# Patient Record
Sex: FEMALE | Race: OTHER | HISPANIC OR LATINO | URBAN - METROPOLITAN AREA
[De-identification: names, ages, dates, MRNs, and addresses within clinical notes are randomized per-mention and may not be internally consistent; named-entity substitution may affect disease eponyms.]

---

## 2021-02-06 ENCOUNTER — EMERGENCY (EMERGENCY)
Facility: HOSPITAL | Age: 26
LOS: 1 days | Discharge: HOME | End: 2021-02-06
Attending: EMERGENCY MEDICINE
Payer: MEDICAID

## 2021-02-06 VITALS
HEART RATE: 115 BPM | HEIGHT: 61.81 IN | OXYGEN SATURATION: 100 % | SYSTOLIC BLOOD PRESSURE: 116 MMHG | DIASTOLIC BLOOD PRESSURE: 78 MMHG | WEIGHT: 110.01 LBS | TEMPERATURE: 98 F | RESPIRATION RATE: 20 BRPM

## 2021-02-06 VITALS — SYSTOLIC BLOOD PRESSURE: 110 MMHG | DIASTOLIC BLOOD PRESSURE: 74 MMHG | HEART RATE: 89 BPM

## 2021-02-06 DIAGNOSIS — O02.1 MISSED ABORTION: ICD-10-CM

## 2021-02-06 LAB
ANION GAP SERPL CALC-SCNC: 6 MMOL/L — SIGNIFICANT CHANGE UP (ref 5–17)
APPEARANCE UR: ABNORMAL
BACTERIA # UR AUTO: ABNORMAL /HPF
BASOPHILS # BLD AUTO: 0.06 K/UL — SIGNIFICANT CHANGE UP (ref 0–0.2)
BASOPHILS NFR BLD AUTO: 0.7 % — SIGNIFICANT CHANGE UP (ref 0–2)
BILIRUB UR-MCNC: ABNORMAL
BLD GP AB SCN SERPL QL: SIGNIFICANT CHANGE UP
BUN SERPL-MCNC: 8 MG/DL — SIGNIFICANT CHANGE UP (ref 7–18)
CALCIUM SERPL-MCNC: 8.5 MG/DL — SIGNIFICANT CHANGE UP (ref 8.4–10.5)
CHLORIDE SERPL-SCNC: 106 MMOL/L — SIGNIFICANT CHANGE UP (ref 96–108)
CO2 SERPL-SCNC: 25 MMOL/L — SIGNIFICANT CHANGE UP (ref 22–31)
COLOR SPEC: ABNORMAL
COMMENT - URINE: SIGNIFICANT CHANGE UP
CREAT SERPL-MCNC: 0.74 MG/DL — SIGNIFICANT CHANGE UP (ref 0.5–1.3)
DIFF PNL FLD: ABNORMAL
EOSINOPHIL # BLD AUTO: 0.06 K/UL — SIGNIFICANT CHANGE UP (ref 0–0.5)
EOSINOPHIL NFR BLD AUTO: 0.7 % — SIGNIFICANT CHANGE UP (ref 0–6)
EPI CELLS # UR: ABNORMAL /HPF
GLUCOSE SERPL-MCNC: 95 MG/DL — SIGNIFICANT CHANGE UP (ref 70–99)
GLUCOSE UR QL: NEGATIVE — SIGNIFICANT CHANGE UP
HCG SERPL-ACNC: HIGH MIU/ML
HCT VFR BLD CALC: 37.4 % — SIGNIFICANT CHANGE UP (ref 34.5–45)
HGB BLD-MCNC: 12.1 G/DL — SIGNIFICANT CHANGE UP (ref 11.5–15.5)
IMM GRANULOCYTES NFR BLD AUTO: 0.2 % — SIGNIFICANT CHANGE UP (ref 0–1.5)
KETONES UR-MCNC: ABNORMAL
LEUKOCYTE ESTERASE UR-ACNC: ABNORMAL
LYMPHOCYTES # BLD AUTO: 2.11 K/UL — SIGNIFICANT CHANGE UP (ref 1–3.3)
LYMPHOCYTES # BLD AUTO: 23.8 % — SIGNIFICANT CHANGE UP (ref 13–44)
MCHC RBC-ENTMCNC: 26.4 PG — LOW (ref 27–34)
MCHC RBC-ENTMCNC: 32.4 GM/DL — SIGNIFICANT CHANGE UP (ref 32–36)
MCV RBC AUTO: 81.7 FL — SIGNIFICANT CHANGE UP (ref 80–100)
MONOCYTES # BLD AUTO: 0.63 K/UL — SIGNIFICANT CHANGE UP (ref 0–0.9)
MONOCYTES NFR BLD AUTO: 7.1 % — SIGNIFICANT CHANGE UP (ref 2–14)
NEUTROPHILS # BLD AUTO: 5.99 K/UL — SIGNIFICANT CHANGE UP (ref 1.8–7.4)
NEUTROPHILS NFR BLD AUTO: 67.5 % — SIGNIFICANT CHANGE UP (ref 43–77)
NITRITE UR-MCNC: NEGATIVE — SIGNIFICANT CHANGE UP
NRBC # BLD: 0 /100 WBCS — SIGNIFICANT CHANGE UP (ref 0–0)
PH UR: 6.5 — SIGNIFICANT CHANGE UP (ref 5–8)
PLATELET # BLD AUTO: 248 K/UL — SIGNIFICANT CHANGE UP (ref 150–400)
POTASSIUM SERPL-MCNC: 3.3 MMOL/L — LOW (ref 3.5–5.3)
POTASSIUM SERPL-SCNC: 3.3 MMOL/L — LOW (ref 3.5–5.3)
PROT UR-MCNC: 500 MG/DL
RBC # BLD: 4.58 M/UL — SIGNIFICANT CHANGE UP (ref 3.8–5.2)
RBC # FLD: 14.2 % — SIGNIFICANT CHANGE UP (ref 10.3–14.5)
RBC CASTS # UR COMP ASSIST: >50 /HPF (ref 0–2)
SODIUM SERPL-SCNC: 137 MMOL/L — SIGNIFICANT CHANGE UP (ref 135–145)
SP GR SPEC: 1.02 — SIGNIFICANT CHANGE UP (ref 1.01–1.02)
UROBILINOGEN FLD QL: NEGATIVE — SIGNIFICANT CHANGE UP
WBC # BLD: 8.87 K/UL — SIGNIFICANT CHANGE UP (ref 3.8–10.5)
WBC # FLD AUTO: 8.87 K/UL — SIGNIFICANT CHANGE UP (ref 3.8–10.5)
WBC UR QL: SIGNIFICANT CHANGE UP /HPF (ref 0–5)

## 2021-02-06 PROCEDURE — 76817 TRANSVAGINAL US OBSTETRIC: CPT

## 2021-02-06 PROCEDURE — 85025 COMPLETE CBC W/AUTO DIFF WBC: CPT

## 2021-02-06 PROCEDURE — 99284 EMERGENCY DEPT VISIT MOD MDM: CPT | Mod: 25

## 2021-02-06 PROCEDURE — 84702 CHORIONIC GONADOTROPIN TEST: CPT

## 2021-02-06 PROCEDURE — 76801 OB US < 14 WKS SINGLE FETUS: CPT

## 2021-02-06 PROCEDURE — 86901 BLOOD TYPING SEROLOGIC RH(D): CPT

## 2021-02-06 PROCEDURE — 76817 TRANSVAGINAL US OBSTETRIC: CPT | Mod: 26

## 2021-02-06 PROCEDURE — 86850 RBC ANTIBODY SCREEN: CPT

## 2021-02-06 PROCEDURE — 36415 COLL VENOUS BLD VENIPUNCTURE: CPT

## 2021-02-06 PROCEDURE — 76801 OB US < 14 WKS SINGLE FETUS: CPT | Mod: 26

## 2021-02-06 PROCEDURE — 80048 BASIC METABOLIC PNL TOTAL CA: CPT

## 2021-02-06 PROCEDURE — 81001 URINALYSIS AUTO W/SCOPE: CPT

## 2021-02-06 PROCEDURE — 99285 EMERGENCY DEPT VISIT HI MDM: CPT

## 2021-02-06 PROCEDURE — 86900 BLOOD TYPING SEROLOGIC ABO: CPT

## 2021-02-06 RX ORDER — SODIUM CHLORIDE 9 MG/ML
1000 INJECTION INTRAMUSCULAR; INTRAVENOUS; SUBCUTANEOUS ONCE
Refills: 0 | Status: DISCONTINUED | OUTPATIENT
Start: 2021-02-06 | End: 2021-02-06

## 2021-02-06 NOTE — ED PROVIDER NOTE - PATIENT PORTAL LINK FT
You can access the FollowMyHealth Patient Portal offered by [facility] by registering at the following website: [email or web address]. By joining [facility]’s FollowMyHealth portal, you will also be able to view your health information using other applications (apps) compatible with our system.

## 2021-02-06 NOTE — ED PROVIDER NOTE - PROGRESS NOTE DETAILS
Pt seen and evaluated by GYN attending, recommend discharge and pt to return to ED in 2 days for repeat blood and sono. Precautions reviewed with  and pt voices understanding.

## 2021-02-06 NOTE — ED PROVIDER NOTE - OBJECTIVE STATEMENT
Translation services for Citizen of [place] were utilized ([id number]) 26 y/o F  presents to the ED c/o vaginal bleeding. Patient states she had x1 episode of vaginal bleeding last week and today is heavily bleeding as well. Denies any fevers, chills, nausea, vomiting, urinary complaints, abdominal pain or any other complaints. LMP: 2021

## 2021-02-06 NOTE — CONSULT NOTE ADULT - SUBJECTIVE AND OBJECTIVE BOX
HPI: 24 y/o  LMP: 2020 (irregular LMP) presents to the ED with heavy vaginal bleeding since last night. Pt states she had her period 2020 as normal and took a home pregnancy test with two positive results. Pt states she had 1 episode of spotting on 31 which resolved and then started the heavy bleeding last night. Pt states the bleeding is associated with mild abd pain. Pt states this is a desired pregnancy. Pt denies vaginal discharge, cp, sob, dizziness, palpitations, n/v/d/c, fever, chills or any other complaints     Pt does not have a OBGYN     pob/gynhx:    2012   x1 in [place] uncomplicated per patient  2019 primary c/s - pt unsure of indication   Pt reports h/o ovarian cysts, denies std's, abn pap smear, fibroids   pmhx: denies   pshx: c/s x1, appendectomy   all: NKA  meds: Tylenol prn   sochx: Pt denies etoh/drug/tobacco use  pt denies h/o anxiety or depression    REVIEW OF SYSTEMS: see HPI	    PE:  Vital Signs Last 24 Hrs  T(C): 36.4 (2021 10:25), Max: 36.4 (2021 10:25)  T(F): 97.5 (2021 10:25), Max: 97.5 (2021 10:25)  HR: 89 (2021 13:46) (89 - 115)  BP: 110/74 (2021 13:46) (110/74 - 116/78)  BP(mean): --  RR: 20 (2021 10:25) (20 - 20)  SpO2: 100% (2021 10:25) (100% - 100%)    PE  Gen: A&Ox3, NAD  abd: +bs; soft, nt, nd, no rebound tenderness or guarding; no cvat b/l  pelvis: no cmt; (+) moderate vaginal bleeding, ~ 10 to 15 cc dark blood in vaginal canal, no abnormal discharge; no odor, closed/long, no uterine tenderness; uterus approx 8wks size regular in contour, mobile. No adnexal tenderness or masses appreciated b/l     LABS:                        12.1   8.87  )-----------( 248      ( 2021 11:35 )             37.4     02-    137  |  106  |  8   ----------------------------<  95  3.3<L>   |  25  |  0.74    Ca    8.5      2021 11:35    [facility] [place] [id number]      Urinalysis Basic - ( 2021 11:35 )    Color: Brown / Appearance: very cloudy / S.025 / pH: x  Gluc: x / Ketone: Large  / Bili: Small / Urobili: Negative   Blood: x / Protein: 500 mg/dL / Nitrite: Negative   Leuk Esterase: Trace / RBC: >50 /HPF / WBC 0-2 /HPF   Sq Epi: x / Non Sq Epi: Occasional /HPF / Bacteria: Moderate /HPF        RADIOLOGY & ADDITIONAL STUDIES:  l< from: US Transvaginal, OB (21 @ 12:49) >    EXAM:  US OB TRANSVAGINAL                          EXAM:  US OB LES THAN 14 WKS 1ST GEST                            PROCEDURE DATE:  2021        INTERPRETATION:  CLINICAL INFORMATION: Vaginal bleeding, pelvic pain, beta hCG of 19,000    LMP: 2021    Estimated Gestational Age by LMP: 2 weeks 0 days    COMPARISON: None available.    Endovaginal and transabdominal pelvic sonogram.    FINDINGS:  Uterus: Retroverted measuring 9.1 x 5.4 x 6.6 cm.    Endometrium: Markedly thickened endometrium measuring up to 2.1 cm with internal vascularity. No intrauterine gestational sac identified.    Right ovary: 3.9 x 2.7 x 3.9 cm. Right adnexal complex lesion measuring 2.3 x 1.8 x 2.3 cm.  Left ovary: 3.0 x 1.7 x 2.1 cm. Within normal limits.    Ovarian Waveforms: normal arterial and venous    Fluid: None.    IMPRESSION:    No intrauterine pregnancy.Right adnexal complex lesion measuring 2.3 cm, with beta hCG of 19,000, is concerning for an ectopic pregnancy until proven otherwise.    Critical value:  I discussed the finding of this report with Dr. Osborne at 2021 1:24 PM.  Critical value policy of the hospital was followed.  Read back and confirmation of receipt of this communication was performed.  This verbal communication supplements the text report of this document.      NADIRA MATSON M.D., ATTENDING RADIOLOGIST  This document has been electronically signed. 2021  1:26PM       HPI: 24 y/o  LMP: 2020 (irregular LMP) presents to the ED with heavy vaginal bleeding since last night. Pt states she had her period 2020 as normal and took a home pregnancy test with two positive results. Pt states she had 1 episode of spotting on 31 which resolved and then started the heavy bleeding last night. Pt states the bleeding is associated with mild abd pain. Pt states shes been changing her pad every 2 hours. Pt states this is a desired pregnancy. Pt denies vaginal discharge, cp, sob, dizziness, palpitations, n/v/d/c, fever, chills or any other complaints     Pt does not have a OBGYN     pob/gynhx:    2012   x1 in [place] uncomplicated per patient  2019 primary c/s - pt unsure of indication   Pt reports h/o ovarian cysts, denies std's, abn pap smear, fibroids   pmhx: denies   pshx: c/s x1, appendectomy   all: NKA  meds: Tylenol prn   sochx: Pt denies etoh/drug/tobacco use  pt denies h/o anxiety or depression    REVIEW OF SYSTEMS: see HPI	    PE:  Vital Signs Last 24 Hrs  T(C): 36.4 (2021 10:25), Max: 36.4 (2021 10:25)  T(F): 97.5 (2021 10:25), Max: 97.5 (2021 10:25)  HR: 89 (2021 13:46) (89 - 115)  BP: 110/74 (2021 13:46) (110/74 - 116/78)  BP(mean): --  RR: 20 (2021 10:25) (20 - 20)  SpO2: 100% (2021 10:25) (100% - 100%)    PE  Gen: A&Ox3, NAD  abd: +bs; soft, nt, nd, no rebound tenderness or guarding; no cvat b/l  pelvis: no cmt; (+) moderate vaginal bleeding, ~ 10 to 15 cc dark blood in vaginal canal, no abnormal discharge; no odor, closed/long, no uterine tenderness; uterus approx 8wks size regular in contour, mobile. No adnexal tenderness or masses appreciated b/l     LABS:                        12.1   8.87  )-----------( 248      ( 2021 11:35 )             37.4     02-    137  |  106  |  8   ----------------------------<  95  3.3<L>   |  25  |  0.74    Ca    8.5      2021 11:35    CG [id number]      Urinalysis Basic - ( 2021 11:35 )    Color: Brown / Appearance: very cloudy / S.025 / pH: x  Gluc: x / Ketone: Large  / Bili: Small / Urobili: Negative   Blood: x / Protein: 500 mg/dL / Nitrite: Negative   Leuk Esterase: Trace / RBC: >50 /HPF / WBC 0-2 /HPF   Sq Epi: x / Non Sq Epi: Occasional /HPF / Bacteria: Moderate /HPF        RADIOLOGY & ADDITIONAL STUDIES:  l< from: US Transvaginal, OB (21 @ 12:49) >    EXAM:  US OB TRANSVAGINAL                          EXAM:  US OB LES THAN 14 WKS 1ST GEST                            PROCEDURE DATE:  2021        INTERPRETATION:  CLINICAL INFORMATION: Vaginal bleeding, pelvic pain, beta hCG of 19,000    LMP: 2021    Estimated Gestational Age by LMP: 2 weeks 0 days    COMPARISON: None available.    Endovaginal and transabdominal pelvic sonogram.    FINDINGS:  Uterus: Retroverted measuring 9.1 x 5.4 x 6.6 cm.    Endometrium: Markedly thickened endometrium measuring up to 2.1 cm with internal vascularity. No intrauterine gestational sac identified.    Right ovary: 3.9 x 2.7 x 3.9 cm. Right adnexal complex lesion measuring 2.3 x 1.8 x 2.3 cm.  Left ovary: 3.0 x 1.7 x 2.1 cm. Within normal limits.    Ovarian Waveforms: normal arterial and venous    Fluid: None.    IMPRESSION:    No intrauterine pregnancy.Right adnexal complex lesion measuring 2.3 cm, with beta hCG of 19,000, is concerning for an ectopic pregnancy until proven otherwise.    Critical value:  I discussed the finding of this report with Dr. Osborne at 2021 1:24 PM.  Critical value policy of the hospital was followed.  Read back and confirmation of receipt of this communication was performed.  This verbal communication supplements the text report of this document.      NADIRA MATSON M.D., ATTENDING RADIOLOGIST  This document has been electronically signed. 2021  1:26PM

## 2021-02-06 NOTE — ED ADULT NURSE NOTE - OBJECTIVE STATEMENT
AOX4 +ambulatory patient reports +pregnancy complaining of vaginal bleeding last week and heavy vaginal bleeding recently as well. Patient complaining of mild abdominal cramping. Denies any fevers or chills

## 2021-02-06 NOTE — ED PROVIDER NOTE - CARE PLAN
Principal Discharge DX:	Ectopic pregnancy, unspecified location, unspecified whether intrauterine pregnancy present

## 2021-02-06 NOTE — CONSULT NOTE ADULT - PROBLEM SELECTOR RECOMMENDATION 9
-Pt evaluated and counseled with Dr. Bates at bedside; Pt was educated on ectopic pregnancy and missed . Pt given treatment options including methotrexate injection for ectopic pregnancy, surgical management with diagnostic laparoscopy or conservative management with close follow up for repeat bHCG and sonogram in 48 hours  -At this time patient refuses methotrexate or surgical management and would like conservative management with close follow up   -Pt instructed to return to [facility] ED on 2020 for repeat bHCG and sonogram   -pt agrees and understands treatment and plan  -case d/w and pt evaluated with Dr. Bates, rambo attending

## 2021-02-06 NOTE — CONSULT NOTE ADULT - ASSESSMENT
A/p: 24 y/o female  LMP: 2020 presents to ED with heavy vaginal bleeding, positive bHCG and no intrauterine pregnancy on sonogram; missed AB vs possible ectopic pregnancy,  pt hemodynamically stable      A/p: 24 y/o female  LMP: 2020 approximately 7 weeks gestation based off LMP presents to ED with heavy vaginal bleeding, positive bHCG and no intrauterine pregnancy on sonogram; missed AB vs possible ectopic pregnancy,  pt hemodynamically stable

## 2023-12-07 NOTE — CONSULT NOTE ADULT - PROBLEM SELECTOR PROBLEM 1
Missed 
Viral Respiratory Infection    A viral respiratory infection is an illness that affects parts of the body used for breathing, like the lungs, nose, and throat. It is caused by a germ called a virus. Symptoms can include runny nose, coughing, sneezing, fatigue, body aches, sore throat, fever, or headache. Over the counter medicine can be used to manage the symptoms but the infection typically goes away on its own in 5 to 10 days.     SEEK IMMEDIATE MEDICAL CARE IF YOU HAVE ANY OF THE FOLLOWING SYMPTOMS: shortness of breath, chest pain, fever over 10 days, or lightheadedness/dizziness.     Please follow up with your doctor within 48 hours.